# Patient Record
Sex: FEMALE | Race: WHITE | Employment: FULL TIME | URBAN - NONMETROPOLITAN AREA
[De-identification: names, ages, dates, MRNs, and addresses within clinical notes are randomized per-mention and may not be internally consistent; named-entity substitution may affect disease eponyms.]

---

## 2017-01-23 ENCOUNTER — OFFICE VISIT (OUTPATIENT)
Dept: UROLOGY | Age: 62
End: 2017-01-23

## 2017-01-23 VITALS
SYSTOLIC BLOOD PRESSURE: 124 MMHG | BODY MASS INDEX: 28.06 KG/M2 | WEIGHT: 168.4 LBS | HEIGHT: 65 IN | DIASTOLIC BLOOD PRESSURE: 82 MMHG

## 2017-01-23 DIAGNOSIS — N39.0 RECURRENT UTI: Primary | ICD-10-CM

## 2017-01-23 LAB
BILIRUBIN, POC: NORMAL
BLOOD URINE, POC: NORMAL
CLARITY, POC: CLEAR
COLOR, POC: YELLOW
GLUCOSE URINE, POC: NORMAL
KETONES, POC: NORMAL
LEUKOCYTE EST, POC: NORMAL
NITRITE, POC: NORMAL
PH, POC: 5
POST VOID RESIDUAL (PVR): 0 ML
PROTEIN, POC: NORMAL
SPECIFIC GRAVITY, POC: 1.02
UROBILINOGEN, POC: NORMAL

## 2017-01-23 PROCEDURE — 51798 US URINE CAPACITY MEASURE: CPT | Performed by: UROLOGY

## 2017-01-23 PROCEDURE — G8419 CALC BMI OUT NRM PARAM NOF/U: HCPCS | Performed by: UROLOGY

## 2017-01-23 PROCEDURE — 3017F COLORECTAL CA SCREEN DOC REV: CPT | Performed by: UROLOGY

## 2017-01-23 PROCEDURE — 81003 URINALYSIS AUTO W/O SCOPE: CPT | Performed by: UROLOGY

## 2017-01-23 PROCEDURE — 99203 OFFICE O/P NEW LOW 30 MIN: CPT | Performed by: UROLOGY

## 2017-01-23 PROCEDURE — G8427 DOCREV CUR MEDS BY ELIG CLIN: HCPCS | Performed by: UROLOGY

## 2017-01-23 PROCEDURE — 3014F SCREEN MAMMO DOC REV: CPT | Performed by: UROLOGY

## 2017-01-23 PROCEDURE — G8484 FLU IMMUNIZE NO ADMIN: HCPCS | Performed by: UROLOGY

## 2017-01-23 PROCEDURE — 1036F TOBACCO NON-USER: CPT | Performed by: UROLOGY

## 2017-01-23 ASSESSMENT — ENCOUNTER SYMPTOMS
SHORTNESS OF BREATH: 0
ABDOMINAL PAIN: 0
COLOR CHANGE: 0
NAUSEA: 0
BACK PAIN: 0
EYE REDNESS: 1
CHEST TIGHTNESS: 0
FACIAL SWELLING: 0
EYE PAIN: 1

## 2017-01-24 DIAGNOSIS — N39.0 RECURRENT URINARY TRACT INFECTION: Primary | ICD-10-CM

## 2017-02-06 LAB — CREAT SERPL-MCNC: 0.6 MG/DL

## 2017-02-13 ENCOUNTER — OFFICE VISIT (OUTPATIENT)
Dept: UROLOGY | Age: 62
End: 2017-02-13

## 2017-02-13 VITALS
BODY MASS INDEX: 29.06 KG/M2 | DIASTOLIC BLOOD PRESSURE: 86 MMHG | WEIGHT: 174.4 LBS | HEIGHT: 65 IN | SYSTOLIC BLOOD PRESSURE: 132 MMHG

## 2017-02-13 DIAGNOSIS — N39.0 RECURRENT UTI: Primary | ICD-10-CM

## 2017-02-13 LAB
BILIRUBIN, POC: NORMAL
BLOOD URINE, POC: NORMAL
CLARITY, POC: CLEAR
COLOR, POC: NORMAL
GLUCOSE URINE, POC: NORMAL
KETONES, POC: NORMAL
LEUKOCYTE EST, POC: NORMAL
NITRITE, POC: NORMAL
PH, POC: 5.5
PROTEIN, POC: NORMAL
SPECIFIC GRAVITY, POC: <=1.005
UROBILINOGEN, POC: NORMAL

## 2017-02-13 PROCEDURE — G8427 DOCREV CUR MEDS BY ELIG CLIN: HCPCS | Performed by: UROLOGY

## 2017-02-13 PROCEDURE — 81003 URINALYSIS AUTO W/O SCOPE: CPT | Performed by: UROLOGY

## 2017-02-13 PROCEDURE — G8419 CALC BMI OUT NRM PARAM NOF/U: HCPCS | Performed by: UROLOGY

## 2017-02-13 PROCEDURE — 3014F SCREEN MAMMO DOC REV: CPT | Performed by: UROLOGY

## 2017-02-13 PROCEDURE — G8484 FLU IMMUNIZE NO ADMIN: HCPCS | Performed by: UROLOGY

## 2017-02-13 PROCEDURE — 1036F TOBACCO NON-USER: CPT | Performed by: UROLOGY

## 2017-02-13 PROCEDURE — 99213 OFFICE O/P EST LOW 20 MIN: CPT | Performed by: UROLOGY

## 2017-02-13 PROCEDURE — 3017F COLORECTAL CA SCREEN DOC REV: CPT | Performed by: UROLOGY

## 2017-08-14 ENCOUNTER — OFFICE VISIT (OUTPATIENT)
Dept: UROLOGY | Age: 62
End: 2017-08-14
Payer: COMMERCIAL

## 2017-08-14 VITALS
BODY MASS INDEX: 28.69 KG/M2 | DIASTOLIC BLOOD PRESSURE: 82 MMHG | SYSTOLIC BLOOD PRESSURE: 124 MMHG | WEIGHT: 172.2 LBS | HEIGHT: 65 IN

## 2017-08-14 DIAGNOSIS — N39.0 RECURRENT UTI: Primary | ICD-10-CM

## 2017-08-14 LAB
BILIRUBIN, POC: NORMAL
BLOOD URINE, POC: NORMAL
CLARITY, POC: CLEAR
COLOR, POC: YELLOW
GLUCOSE URINE, POC: NORMAL
KETONES, POC: NORMAL
LEUKOCYTE EST, POC: NORMAL
NITRITE, POC: NORMAL
PH, POC: 6
POST VOID RESIDUAL (PVR): 0 ML
PROTEIN, POC: NORMAL
SPECIFIC GRAVITY, POC: 1.01
UROBILINOGEN, POC: NORMAL

## 2017-08-14 PROCEDURE — 81003 URINALYSIS AUTO W/O SCOPE: CPT | Performed by: UROLOGY

## 2017-08-14 PROCEDURE — 3017F COLORECTAL CA SCREEN DOC REV: CPT | Performed by: UROLOGY

## 2017-08-14 PROCEDURE — 99213 OFFICE O/P EST LOW 20 MIN: CPT | Performed by: UROLOGY

## 2017-08-14 PROCEDURE — 3014F SCREEN MAMMO DOC REV: CPT | Performed by: UROLOGY

## 2017-08-14 PROCEDURE — G8419 CALC BMI OUT NRM PARAM NOF/U: HCPCS | Performed by: UROLOGY

## 2017-08-14 PROCEDURE — G8427 DOCREV CUR MEDS BY ELIG CLIN: HCPCS | Performed by: UROLOGY

## 2017-08-14 PROCEDURE — 99999 POST VOID RESIDUAL (PVR): CPT | Performed by: UROLOGY

## 2017-08-14 PROCEDURE — 1036F TOBACCO NON-USER: CPT | Performed by: UROLOGY

## 2017-08-14 RX ORDER — CYCLOBENZAPRINE HCL 10 MG
10 TABLET ORAL PRN
COMMUNITY
End: 2018-07-11

## 2018-07-11 ENCOUNTER — OFFICE VISIT (OUTPATIENT)
Dept: UROLOGY | Age: 63
End: 2018-07-11
Payer: COMMERCIAL

## 2018-07-11 VITALS
SYSTOLIC BLOOD PRESSURE: 128 MMHG | HEIGHT: 64 IN | WEIGHT: 172 LBS | DIASTOLIC BLOOD PRESSURE: 88 MMHG | BODY MASS INDEX: 29.37 KG/M2

## 2018-07-11 DIAGNOSIS — N39.0 RECURRENT UTI: Primary | ICD-10-CM

## 2018-07-11 LAB
BILIRUBIN URINE: NEGATIVE
BLOOD URINE, POC: NEGATIVE
CHARACTER, URINE: CLEAR
COLOR, URINE: YELLOW
GLUCOSE URINE: NEGATIVE MG/DL
KETONES, URINE: NEGATIVE
LEUKOCYTE CLUMPS, URINE: NEGATIVE
NITRITE, URINE: NEGATIVE
PH, URINE: 7
POST VOID RESIDUAL (PVR): 30 ML
PROTEIN, URINE: NEGATIVE MG/DL
SPECIFIC GRAVITY, URINE: 1.01 (ref 1–1.03)
UROBILINOGEN, URINE: 0.2 EU/DL

## 2018-07-11 PROCEDURE — 51798 US URINE CAPACITY MEASURE: CPT | Performed by: NURSE PRACTITIONER

## 2018-07-11 PROCEDURE — G8427 DOCREV CUR MEDS BY ELIG CLIN: HCPCS | Performed by: NURSE PRACTITIONER

## 2018-07-11 PROCEDURE — 3017F COLORECTAL CA SCREEN DOC REV: CPT | Performed by: NURSE PRACTITIONER

## 2018-07-11 PROCEDURE — 99203 OFFICE O/P NEW LOW 30 MIN: CPT | Performed by: NURSE PRACTITIONER

## 2018-07-11 PROCEDURE — 1036F TOBACCO NON-USER: CPT | Performed by: NURSE PRACTITIONER

## 2018-07-11 PROCEDURE — 81003 URINALYSIS AUTO W/O SCOPE: CPT | Performed by: NURSE PRACTITIONER

## 2018-07-11 PROCEDURE — G8419 CALC BMI OUT NRM PARAM NOF/U: HCPCS | Performed by: NURSE PRACTITIONER

## 2018-07-11 RX ORDER — FLAVOXATE HYDROCHLORIDE 100 MG/1
100 TABLET ORAL 3 TIMES DAILY PRN
COMMUNITY
End: 2018-08-09

## 2018-07-11 RX ORDER — CYCLOBENZAPRINE HCL 5 MG
5 TABLET ORAL 3 TIMES DAILY PRN
COMMUNITY
End: 2019-12-31 | Stop reason: ALTCHOICE

## 2018-07-11 ASSESSMENT — ENCOUNTER SYMPTOMS
VOMITING: 0
BACK PAIN: 0
DIARRHEA: 0
NAUSEA: 0

## 2018-07-11 NOTE — PROGRESS NOTES
Ear: External ear normal.   Pulmonary/Chest: Effort normal.   Abdominal: Soft. Neurological: She is alert and oriented to person, place, and time. Skin: Skin is warm and dry. Psychiatric: She has a normal mood and affect. Her behavior is normal. Judgment and thought content normal.     Results for POC orders placed in visit on 07/11/18   POCT Urinalysis No Micro (Auto)   Result Value Ref Range    Glucose, Ur Negative NEGATIVE mg/dl    Bilirubin Urine Negative     Ketones, Urine Negative NEGATIVE    Specific Gravity, Urine 1.010 1.002 - 1.03    Blood, UA POC Negative NEGATIVE    pH, Urine 7.00 5.0 - 9.0    Protein, Urine Negative NEGATIVE mg/dl    Urobilinogen, Urine 0.20 0.0 - 1.0 eu/dl    Nitrite, Urine Negative NEGATIVE    Leukocyte Clumps, Urine Negative NEGATIVE    Color, Urine Yellow YELLOW-STR    Character, Urine Clear [id number]   poct post void residual   Result Value Ref Range    post void residual 30 ml       Assessment:      Urinary frequency  Bladder pressure  Recurrent UTIs  Hx of bladder suspension surgery  Hx of right upper pole nephrectomy      Plan: Will schedule patient for cystoscopy with Dr. Hugh Low to evaluate for obstruction, h/o bladder suspension surgery. Start patient on Myrbetriq, for urinary frequency, urgency symptoms.

## 2018-08-09 ENCOUNTER — PROCEDURE VISIT (OUTPATIENT)
Dept: UROLOGY | Age: 63
End: 2018-08-09
Payer: COMMERCIAL

## 2018-08-09 VITALS
HEIGHT: 64 IN | BODY MASS INDEX: 29.37 KG/M2 | WEIGHT: 172 LBS | DIASTOLIC BLOOD PRESSURE: 62 MMHG | SYSTOLIC BLOOD PRESSURE: 118 MMHG

## 2018-08-09 DIAGNOSIS — Z87.448 HISTORY OF BLADDER SUSPENSION PROCEDURE: ICD-10-CM

## 2018-08-09 DIAGNOSIS — R39.89 SENSATION OF PRESSURE IN BLADDER AREA: Primary | ICD-10-CM

## 2018-08-09 DIAGNOSIS — Z98.890 HISTORY OF BLADDER SUSPENSION PROCEDURE: ICD-10-CM

## 2018-08-09 DIAGNOSIS — N39.0 RECURRENT UTI: ICD-10-CM

## 2018-08-09 LAB
BILIRUBIN URINE: NEGATIVE
BLOOD URINE, POC: NEGATIVE
CHARACTER, URINE: CLEAR
COLOR, URINE: YELLOW
GLUCOSE URINE: NEGATIVE MG/DL
KETONES, URINE: NEGATIVE
LEUKOCYTE CLUMPS, URINE: NEGATIVE
NITRITE, URINE: NEGATIVE
PH, URINE: 6
PROTEIN, URINE: NEGATIVE MG/DL
SPECIFIC GRAVITY, URINE: 1.01 (ref 1–1.03)
UROBILINOGEN, URINE: 0.2 EU/DL

## 2018-08-09 PROCEDURE — 99999 PR OFFICE/OUTPT VISIT,PROCEDURE ONLY: CPT | Performed by: UROLOGY

## 2018-08-09 PROCEDURE — 81003 URINALYSIS AUTO W/O SCOPE: CPT | Performed by: UROLOGY

## 2018-08-09 PROCEDURE — 52000 CYSTOURETHROSCOPY: CPT | Performed by: UROLOGY

## 2019-12-24 ENCOUNTER — HOSPITAL ENCOUNTER (OUTPATIENT)
Dept: WOMENS IMAGING | Age: 64
Discharge: HOME | End: 2019-12-24
Payer: MEDICARE

## 2019-12-24 DIAGNOSIS — Z00.6 ENCOUNTER FOR EXAMINATION FOR NORMAL COMPARISON OR CONTROL IN CLINICAL RESEARCH PROGRAM: ICD-10-CM

## 2019-12-24 PROCEDURE — 3209999900 MAM COMPARISON OF OUTSIDE IMAGES

## 2019-12-27 ENCOUNTER — HOSPITAL ENCOUNTER (OUTPATIENT)
Dept: WOMENS IMAGING | Age: 64
Discharge: HOME | End: 2019-12-27
Payer: MEDICARE

## 2019-12-27 DIAGNOSIS — R92.2 BREAST DENSITY: ICD-10-CM

## 2019-12-27 DIAGNOSIS — Z00.6 ENCOUNTER FOR EXAMINATION FOR NORMAL COMPARISON OR CONTROL IN CLINICAL RESEARCH PROGRAM: ICD-10-CM

## 2019-12-27 PROCEDURE — 3209999900 MAM COMPARISON OF OUTSIDE IMAGES

## 2019-12-27 PROCEDURE — 76642 ULTRASOUND BREAST LIMITED: CPT

## 2019-12-31 ENCOUNTER — HOSPITAL ENCOUNTER (OUTPATIENT)
Dept: WOMENS IMAGING | Age: 64
Discharge: HOME | End: 2019-12-31
Payer: MEDICARE

## 2019-12-31 PROCEDURE — A4648 IMPLANTABLE TISSUE MARKER: HCPCS

## 2019-12-31 PROCEDURE — 2709999900 HC NON-CHARGEABLE SUPPLY

## 2019-12-31 PROCEDURE — 19082 BX BREAST ADD LESION STRTCTC: CPT

## 2019-12-31 PROCEDURE — 19081 BX BREAST 1ST LESION STRTCTC: CPT

## 2019-12-31 PROCEDURE — 77065 DX MAMMO INCL CAD UNI: CPT

## 2019-12-31 PROCEDURE — 2720000010 HC SURG SUPPLY STERILE

## 2019-12-31 PROCEDURE — 88305 TISSUE EXAM BY PATHOLOGIST: CPT

## 2019-12-31 NOTE — PROGRESS NOTES
Breast Biopsy Flowsheet/Post-Operative Care    Date of Procedure: 12/31/2019  Physician: Dr. Wyatt [facility]  Technologist: EVANGELINA Enrique RT(R)(M)    Biopsy:stereotactic breast biopsy  Lesion type: Non-palpable  Breast: left    Clock face position: Site #1: Lower outer quadrant 8 cm from nipple     Site #2: Lower outer quadrant 10 cm from nipple        Primary Method of Detection: Mammogram      Microcalcification's: no   Distribution: N/A    Asymmetry: symmetric    Biopsy Method:   Mammotome:    Site # 1    Gauge: 10    # of Passes: 5     Clip: Tribell    Mammotome:    Site # 2    Gauge: 10    # of Passes: 6     Clip:  Bowtie          Pre-Op Assessment: (BI-RADS)   4. Suspicious Abnormality    Patient Tolerated Procedure: good  Complications: none  Comments: none    Post Operative Care  Steri strips: Yes  Dressing: Gauze, Tape   Ice Applied to Site:  Yes  Evidence of Bleeding:  No  Pain Verbalized: No      Written Discharge Instructions: Yes  Condition at Discharge: good  Time of Discharge: Camelia Burkett    Electronically signed by Peewee Marina RN on 12/31/2019 at 9:17 AM

## 2019-12-31 NOTE — PROGRESS NOTES
[facility]  Pre-Biopsy Assessment      Patient Education    Written information about procedure Yes  left   Procedural steps explained Yes Stereotactic Biopsy   Post-op potential: bruising, hematoma, pain Yes    Self-care: activity, care of dressing Yes    Patient verbalized understanding Yes    Consent signed and witnessed Yes      Hormone Therapy Status: no    Recent Medication: N/A Last Dose: no                                     Hormone Replacement Therapy: no    Previous Breast Biopsy: no    Previous Diagnosis Cancer: no    Hysterectomy:yes - partial 1985    Emotional Status: Nervous    Language or Physical Barriers: none    Comments: none      Electronically signed by Magui Geller RN on 12/31/2019 at 7:53 AM